# Patient Record
Sex: MALE | Race: WHITE | NOT HISPANIC OR LATINO | Employment: FULL TIME | URBAN - METROPOLITAN AREA
[De-identification: names, ages, dates, MRNs, and addresses within clinical notes are randomized per-mention and may not be internally consistent; named-entity substitution may affect disease eponyms.]

---

## 2024-02-04 ENCOUNTER — APPOINTMENT (OUTPATIENT)
Dept: CT IMAGING | Facility: HOSPITAL | Age: 34
End: 2024-02-04

## 2024-02-04 ENCOUNTER — APPOINTMENT (OUTPATIENT)
Dept: GENERAL RADIOLOGY | Facility: HOSPITAL | Age: 34
End: 2024-02-04

## 2024-02-04 ENCOUNTER — HOSPITAL ENCOUNTER (EMERGENCY)
Facility: HOSPITAL | Age: 34
Discharge: HOME | End: 2024-02-04
Attending: EMERGENCY MEDICINE | Admitting: EMERGENCY MEDICINE

## 2024-02-04 VITALS
TEMPERATURE: 97.9 F | HEART RATE: 93 BPM | DIASTOLIC BLOOD PRESSURE: 89 MMHG | OXYGEN SATURATION: 97 % | SYSTOLIC BLOOD PRESSURE: 135 MMHG | RESPIRATION RATE: 18 BRPM | HEIGHT: 68 IN | BODY MASS INDEX: 26.3 KG/M2 | WEIGHT: 173.5 LBS

## 2024-02-04 DIAGNOSIS — S42.024A CLOSED NONDISPLACED FRACTURE OF SHAFT OF RIGHT CLAVICLE, INITIAL ENCOUNTER: ICD-10-CM
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
## 2024-02-04 DIAGNOSIS — S42.101A CLOSED FRACTURE OF RIGHT SCAPULA, UNSPECIFIED PART OF SCAPULA, INITIAL ENCOUNTER: ICD-10-CM

## 2024-02-04 LAB
HOLD SPECIMEN: NORMAL
HOLD SPECIMEN: NORMAL
WHOLE BLOOD HOLD COAG: NORMAL
WHOLE BLOOD HOLD SPECIMEN: NORMAL

## 2024-02-04 PROCEDURE — 99284 EMERGENCY DEPT VISIT MOD MDM: CPT

## 2024-02-04 PROCEDURE — 96376 TX/PRO/DX INJ SAME DRUG ADON: CPT

## 2024-02-04 PROCEDURE — 70450 CT HEAD/BRAIN W/O DYE: CPT

## 2024-02-04 PROCEDURE — 25010000002 ONDANSETRON PER 1 MG: Performed by: EMERGENCY MEDICINE

## 2024-02-04 PROCEDURE — 72125 CT NECK SPINE W/O DYE: CPT

## 2024-02-04 PROCEDURE — 96375 TX/PRO/DX INJ NEW DRUG ADDON: CPT

## 2024-02-04 PROCEDURE — 71250 CT THORAX DX C-: CPT

## 2024-02-04 PROCEDURE — 73030 X-RAY EXAM OF SHOULDER: CPT

## 2024-02-04 PROCEDURE — 25010000002 HYDROMORPHONE 1 MG/ML SOLUTION: Performed by: EMERGENCY MEDICINE

## 2024-02-04 PROCEDURE — 96374 THER/PROPH/DIAG INJ IV PUSH: CPT

## 2024-02-04 RX ORDER — ONDANSETRON 2 MG/ML
4 INJECTION INTRAMUSCULAR; INTRAVENOUS ONCE
Status: COMPLETED | OUTPATIENT
Start: 2024-02-04 | End: 2024-02-04

## 2024-02-04 RX ORDER — HYDROCODONE BITARTRATE AND ACETAMINOPHEN 5; 325 MG/1; MG/1
1 TABLET ORAL EVERY 6 HOURS PRN
Qty: 15 TABLET | Refills: 0 | Status: SHIPPED | OUTPATIENT
Start: 2024-02-04 | End: 2024-02-09 | Stop reason: HOSPADM

## 2024-02-04 RX ADMIN — ONDANSETRON 4 MG: 2 INJECTION INTRAMUSCULAR; INTRAVENOUS at 16:09

## 2024-02-04 RX ADMIN — HYDROMORPHONE HYDROCHLORIDE 1 MG: 1 INJECTION, SOLUTION INTRAMUSCULAR; INTRAVENOUS; SUBCUTANEOUS at 17:13

## 2024-02-04 RX ADMIN — HYDROMORPHONE HYDROCHLORIDE 1 MG: 1 INJECTION, SOLUTION INTRAMUSCULAR; INTRAVENOUS; SUBCUTANEOUS at 16:09

## 2024-02-04 NOTE — Clinical Note
[facility] EMERGENCY ROOM  [address]  [place] [address]  Phone: [phone]    Melvin Padron was seen and treated in our emergency department on 2/4/2024.  He may return to work on 02/07/2024.         Thank you for choosing [facility].    Alec Juares, DO
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
## 2024-02-05 ENCOUNTER — TELEPHONE (OUTPATIENT)
Dept: ORTHOPEDIC SURGERY | Facility: CLINIC | Age: 34
End: 2024-02-05
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
## 2024-02-05 NOTE — DISCHARGE INSTRUCTIONS
Wear sling as directed.  Take pain medication as directed.  Apply ice to the affected area 20 minutes at a time 4 times daily.  Follow-up with orthopedist and your primary care doctor this week.

## 2024-02-08 ENCOUNTER — PREP FOR SURGERY (OUTPATIENT)
Dept: OTHER | Facility: HOSPITAL | Age: 34
End: 2024-02-08

## 2024-02-08 ENCOUNTER — OFFICE VISIT (OUTPATIENT)
Dept: ORTHOPEDIC SURGERY | Facility: CLINIC | Age: 34
End: 2024-02-08

## 2024-02-08 VITALS
BODY MASS INDEX: 27.28 KG/M2 | DIASTOLIC BLOOD PRESSURE: 87 MMHG | SYSTOLIC BLOOD PRESSURE: 134 MMHG | HEIGHT: 68 IN | WEIGHT: 180 LBS | OXYGEN SATURATION: 96 % | HEART RATE: 107 BPM

## 2024-02-08 DIAGNOSIS — S42.021A CLOSED DISPLACED FRACTURE OF SHAFT OF RIGHT CLAVICLE, INITIAL ENCOUNTER: ICD-10-CM

## 2024-02-08 DIAGNOSIS — S42.101A CLOSED FRACTURE OF RIGHT SCAPULA, UNSPECIFIED PART OF SCAPULA, INITIAL ENCOUNTER: Primary | ICD-10-CM

## 2024-02-08 DIAGNOSIS — S42.021A CLOSED DISPLACED FRACTURE OF SHAFT OF RIGHT CLAVICLE, INITIAL ENCOUNTER: Primary | ICD-10-CM

## 2024-02-08 RX ORDER — HYDROCODONE BITARTRATE AND ACETAMINOPHEN 7.5; 325 MG/1; MG/1
1 TABLET ORAL EVERY 4 HOURS PRN
Qty: 30 TABLET | Refills: 0 | Status: SHIPPED | OUTPATIENT
Start: 2024-02-08

## 2024-02-08 RX ORDER — CEFAZOLIN SODIUM 2 G/100ML
2 INJECTION, SOLUTION INTRAVENOUS ONCE
Status: CANCELLED | OUTPATIENT
Start: 2024-02-08 | End: 2024-02-08
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
## 2024-02-09 ENCOUNTER — APPOINTMENT (OUTPATIENT)
Dept: GENERAL RADIOLOGY | Facility: HOSPITAL | Age: 34
End: 2024-02-09

## 2024-02-09 ENCOUNTER — ANESTHESIA EVENT CONVERTED (OUTPATIENT)
Dept: ANESTHESIOLOGY | Facility: HOSPITAL | Age: 34
End: 2024-02-09

## 2024-02-09 ENCOUNTER — HOSPITAL ENCOUNTER (OUTPATIENT)
Facility: HOSPITAL | Age: 34
Setting detail: HOSPITAL OUTPATIENT SURGERY
Discharge: HOME | End: 2024-02-09
Attending: ORTHOPAEDIC SURGERY | Admitting: ORTHOPAEDIC SURGERY

## 2024-02-09 ENCOUNTER — ANESTHESIA EVENT (OUTPATIENT)
Dept: PERIOP | Facility: HOSPITAL | Age: 34
End: 2024-02-09

## 2024-02-09 ENCOUNTER — ANESTHESIA (OUTPATIENT)
Dept: PERIOP | Facility: HOSPITAL | Age: 34
End: 2024-02-09

## 2024-02-09 VITALS
HEIGHT: 68 IN | WEIGHT: 177.47 LBS | OXYGEN SATURATION: 98 % | RESPIRATION RATE: 15 BRPM | DIASTOLIC BLOOD PRESSURE: 87 MMHG | TEMPERATURE: 97.2 F | BODY MASS INDEX: 26.9 KG/M2 | HEART RATE: 76 BPM | SYSTOLIC BLOOD PRESSURE: 125 MMHG

## 2024-02-09 DIAGNOSIS — S42.021A CLOSED DISPLACED FRACTURE OF SHAFT OF RIGHT CLAVICLE, INITIAL ENCOUNTER: ICD-10-CM

## 2024-02-09 PROCEDURE — L3670 SO ACRO/CLAV CAN WEB PRE OTS: HCPCS | Performed by: ORTHOPAEDIC SURGERY

## 2024-02-09 PROCEDURE — 25010000002 FENTANYL CITRATE (PF) 50 MCG/ML SOLUTION: Performed by: NURSE ANESTHETIST, CERTIFIED REGISTERED

## 2024-02-09 PROCEDURE — 25010000002 CEFAZOLIN IN DEXTROSE 2000 MG/ 100 ML SOLUTION: Performed by: ORTHOPAEDIC SURGERY

## 2024-02-09 PROCEDURE — C1713 ANCHOR/SCREW BN/BN,TIS/BN: HCPCS | Performed by: ORTHOPAEDIC SURGERY

## 2024-02-09 PROCEDURE — 25010000002 SUGAMMADEX 200 MG/2ML SOLUTION: Performed by: NURSE ANESTHETIST, CERTIFIED REGISTERED

## 2024-02-09 PROCEDURE — 25010000002 MIDAZOLAM PER 1MG: Performed by: ANESTHESIOLOGY

## 2024-02-09 PROCEDURE — 23515 OPTX CLAVICULAR FX W/INT FIX: CPT | Performed by: ORTHOPAEDIC SURGERY

## 2024-02-09 PROCEDURE — 76000 FLUOROSCOPY <1 HR PHYS/QHP: CPT

## 2024-02-09 PROCEDURE — 25010000002 PROPOFOL 10 MG/ML EMULSION: Performed by: NURSE ANESTHETIST, CERTIFIED REGISTERED

## 2024-02-09 PROCEDURE — 25010000002 DEXAMETHASONE PER 1 MG: Performed by: NURSE ANESTHETIST, CERTIFIED REGISTERED

## 2024-02-09 PROCEDURE — 23515 OPTX CLAVICULAR FX W/INT FIX: CPT | Performed by: PHYSICIAN ASSISTANT

## 2024-02-09 PROCEDURE — 25010000002 ONDANSETRON PER 1 MG: Performed by: NURSE ANESTHETIST, CERTIFIED REGISTERED

## 2024-02-09 PROCEDURE — 25810000003 LACTATED RINGERS PER 1000 ML: Performed by: ANESTHESIOLOGY

## 2024-02-09 PROCEDURE — 25010000002 ROPIVACAINE PER 1 MG: Performed by: ANESTHESIOLOGY

## 2024-02-09 DEVICE — IMPLANTABLE DEVICE: Type: IMPLANTABLE DEVICE | Site: CLAVICLE | Status: FUNCTIONAL

## 2024-02-09 RX ORDER — PROMETHAZINE HYDROCHLORIDE 25 MG/1
25 SUPPOSITORY RECTAL ONCE AS NEEDED
Status: DISCONTINUED | OUTPATIENT
Start: 2024-02-09 | End: 2024-02-09 | Stop reason: HOSPADM

## 2024-02-09 RX ORDER — OXYCODONE HYDROCHLORIDE 5 MG/1
5 TABLET ORAL
Status: DISCONTINUED | OUTPATIENT
Start: 2024-02-09 | End: 2024-02-09 | Stop reason: HOSPADM

## 2024-02-09 RX ORDER — FENTANYL CITRATE 50 UG/ML
INJECTION, SOLUTION INTRAMUSCULAR; INTRAVENOUS AS NEEDED
Status: DISCONTINUED | OUTPATIENT
Start: 2024-02-09 | End: 2024-02-09 | Stop reason: SURG

## 2024-02-09 RX ORDER — MIDAZOLAM HYDROCHLORIDE 2 MG/2ML
2 INJECTION, SOLUTION INTRAMUSCULAR; INTRAVENOUS ONCE
Status: COMPLETED | OUTPATIENT
Start: 2024-02-09 | End: 2024-02-09

## 2024-02-09 RX ORDER — ACETAMINOPHEN 500 MG
1000 TABLET ORAL ONCE
Status: COMPLETED | OUTPATIENT
Start: 2024-02-09 | End: 2024-02-09

## 2024-02-09 RX ORDER — PROMETHAZINE HYDROCHLORIDE 12.5 MG/1
25 TABLET ORAL ONCE AS NEEDED
Status: DISCONTINUED | OUTPATIENT
Start: 2024-02-09 | End: 2024-02-09 | Stop reason: HOSPADM

## 2024-02-09 RX ORDER — SODIUM CHLORIDE 9 MG/ML
40 INJECTION, SOLUTION INTRAVENOUS AS NEEDED
Status: DISCONTINUED | OUTPATIENT
Start: 2024-02-09 | End: 2024-02-09 | Stop reason: HOSPADM

## 2024-02-09 RX ORDER — ONDANSETRON 2 MG/ML
INJECTION INTRAMUSCULAR; INTRAVENOUS AS NEEDED
Status: DISCONTINUED | OUTPATIENT
Start: 2024-02-09 | End: 2024-02-09 | Stop reason: SURG

## 2024-02-09 RX ORDER — ROPIVACAINE HYDROCHLORIDE 5 MG/ML
INJECTION, SOLUTION EPIDURAL; INFILTRATION; PERINEURAL
Status: COMPLETED | OUTPATIENT
Start: 2024-02-09 | End: 2024-02-09

## 2024-02-09 RX ORDER — MEPERIDINE HYDROCHLORIDE 25 MG/ML
12.5 INJECTION INTRAMUSCULAR; INTRAVENOUS; SUBCUTANEOUS
Status: DISCONTINUED | OUTPATIENT
Start: 2024-02-09 | End: 2024-02-09 | Stop reason: HOSPADM

## 2024-02-09 RX ORDER — PROPOFOL 10 MG/ML
VIAL (ML) INTRAVENOUS AS NEEDED
Status: DISCONTINUED | OUTPATIENT
Start: 2024-02-09 | End: 2024-02-09 | Stop reason: SURG

## 2024-02-09 RX ORDER — HYDROCODONE BITARTRATE AND ACETAMINOPHEN 7.5; 325 MG/1; MG/1
1 TABLET ORAL EVERY 4 HOURS PRN
Qty: 36 TABLET | Refills: 0 | Status: SHIPPED | OUTPATIENT
Start: 2024-02-09 | End: 2024-02-19 | Stop reason: SDUPTHER

## 2024-02-09 RX ORDER — NALOXONE HYDROCHLORIDE 4 MG/.1ML
SPRAY NASAL
Qty: 2 EACH | Refills: 0 | Status: SHIPPED | OUTPATIENT
Start: 2024-02-09

## 2024-02-09 RX ORDER — CEPHALEXIN 500 MG/1
500 CAPSULE ORAL EVERY 12 HOURS
Qty: 10 CAPSULE | Refills: 0 | Status: SHIPPED | OUTPATIENT
Start: 2024-02-09

## 2024-02-09 RX ORDER — ROCURONIUM BROMIDE 10 MG/ML
INJECTION, SOLUTION INTRAVENOUS AS NEEDED
Status: DISCONTINUED | OUTPATIENT
Start: 2024-02-09 | End: 2024-02-09 | Stop reason: SURG

## 2024-02-09 RX ORDER — SODIUM CHLORIDE, SODIUM LACTATE, POTASSIUM CHLORIDE, CALCIUM CHLORIDE 600; 310; 30; 20 MG/100ML; MG/100ML; MG/100ML; MG/100ML
9 INJECTION, SOLUTION INTRAVENOUS CONTINUOUS PRN
Status: DISCONTINUED | OUTPATIENT
Start: 2024-02-09 | End: 2024-02-09 | Stop reason: HOSPADM

## 2024-02-09 RX ORDER — DEXAMETHASONE SODIUM PHOSPHATE 4 MG/ML
INJECTION, SOLUTION INTRA-ARTICULAR; INTRALESIONAL; INTRAMUSCULAR; INTRAVENOUS; SOFT TISSUE AS NEEDED
Status: DISCONTINUED | OUTPATIENT
Start: 2024-02-09 | End: 2024-02-09 | Stop reason: SURG

## 2024-02-09 RX ORDER — LIDOCAINE HYDROCHLORIDE 20 MG/ML
INJECTION, SOLUTION EPIDURAL; INFILTRATION; INTRACAUDAL; PERINEURAL AS NEEDED
Status: DISCONTINUED | OUTPATIENT
Start: 2024-02-09 | End: 2024-02-09 | Stop reason: SURG

## 2024-02-09 RX ORDER — ONDANSETRON 2 MG/ML
4 INJECTION INTRAMUSCULAR; INTRAVENOUS ONCE AS NEEDED
Status: DISCONTINUED | OUTPATIENT
Start: 2024-02-09 | End: 2024-02-09 | Stop reason: HOSPADM

## 2024-02-09 RX ORDER — CEFAZOLIN SODIUM 2 G/100ML
2 INJECTION, SOLUTION INTRAVENOUS ONCE
Status: COMPLETED | OUTPATIENT
Start: 2024-02-09 | End: 2024-02-09

## 2024-02-09 RX ADMIN — SUGAMMADEX 50 MG: 100 INJECTION, SOLUTION INTRAVENOUS at 14:34

## 2024-02-09 RX ADMIN — ONDANSETRON HYDROCHLORIDE 4 MG: 2 SOLUTION INTRAMUSCULAR; INTRAVENOUS at 13:52

## 2024-02-09 RX ADMIN — SUGAMMADEX 50 MG: 100 INJECTION, SOLUTION INTRAVENOUS at 14:31

## 2024-02-09 RX ADMIN — LIDOCAINE HYDROCHLORIDE 100 MG: 20 INJECTION, SOLUTION EPIDURAL; INFILTRATION; INTRACAUDAL; PERINEURAL at 13:51

## 2024-02-09 RX ADMIN — PROPOFOL 20 MG: 10 INJECTION, EMULSION INTRAVENOUS at 14:06

## 2024-02-09 RX ADMIN — CEFAZOLIN SODIUM 2 G: 2 INJECTION, SOLUTION INTRAVENOUS at 13:44

## 2024-02-09 RX ADMIN — MIDAZOLAM HYDROCHLORIDE 2 MG: 1 INJECTION, SOLUTION INTRAMUSCULAR; INTRAVENOUS at 12:15

## 2024-02-09 RX ADMIN — ROCURONIUM BROMIDE 50 MG: 10 INJECTION, SOLUTION INTRAVENOUS at 13:51

## 2024-02-09 RX ADMIN — ACETAMINOPHEN 1000 MG: 500 TABLET ORAL at 12:07

## 2024-02-09 RX ADMIN — SODIUM CHLORIDE, POTASSIUM CHLORIDE, SODIUM LACTATE AND CALCIUM CHLORIDE 9 ML/HR: 600; 310; 30; 20 INJECTION, SOLUTION INTRAVENOUS at 12:04

## 2024-02-09 RX ADMIN — FENTANYL CITRATE 50 MCG: 50 INJECTION, SOLUTION INTRAMUSCULAR; INTRAVENOUS at 13:52

## 2024-02-09 RX ADMIN — FENTANYL CITRATE 50 MCG: 50 INJECTION, SOLUTION INTRAMUSCULAR; INTRAVENOUS at 13:51

## 2024-02-09 RX ADMIN — SUGAMMADEX 50 MG: 100 INJECTION, SOLUTION INTRAVENOUS at 14:36

## 2024-02-09 RX ADMIN — SUGAMMADEX 50 MG: 100 INJECTION, SOLUTION INTRAVENOUS at 14:37

## 2024-02-09 RX ADMIN — LIDOCAINE HYDROCHLORIDE 100 MG: 20 INJECTION, SOLUTION EPIDURAL; INFILTRATION; INTRACAUDAL; PERINEURAL at 13:52

## 2024-02-09 RX ADMIN — ROPIVACAINE HYDROCHLORIDE 30 ML: 5 INJECTION, SOLUTION EPIDURAL; INFILTRATION; PERINEURAL at 12:14

## 2024-02-09 RX ADMIN — PROPOFOL 20 MG: 10 INJECTION, EMULSION INTRAVENOUS at 14:29

## 2024-02-09 RX ADMIN — PROPOFOL 100 MG: 10 INJECTION, EMULSION INTRAVENOUS at 13:52

## 2024-02-09 RX ADMIN — DEXAMETHASONE SODIUM PHOSPHATE 4 MG: 4 INJECTION, SOLUTION INTRAMUSCULAR; INTRAVENOUS at 13:52

## 2024-02-09 RX ADMIN — PROPOFOL 200 MG: 10 INJECTION, EMULSION INTRAVENOUS at 13:51

## 2024-02-09 NOTE — OP NOTE
CLAVICLE OPEN REDUCTION INTERNAL FIXATION  Procedure Report    Patient Name:  Melvin Padron  YOB: 1990    Date of Surgery:  2/9/2024     Indications: The patient is a 33-year-old male who sustained a right clavicle fracture and a nondisplaced scapular fracture.  Risks and benefits of operative treatment were discussed.  Informed consent was obtained and he wished to proceed.  Risk of bleeding, infection, damage to neurovascular structures, heart attack, stroke, DVT/PE, anesthesia complications including death, continued pain and disability, malunion, nonunion, symptomatic hardware, among others were discussed.  Informed consent was obtained and he wished to proceed.    Pre-op Diagnosis:   Closed displaced fracture of shaft of right clavicle, initial encounter [S42.021A]  Nondisplaced right scapular fracture       Post-Op Diagnosis Codes:     * Closed displaced fracture of shaft of right clavicle, initial encounter [S42.021A]  Nondisplaced right scapular fracture    Procedure/CPT® Codes:      Procedure(s):  Right CLAVICLE OPEN REDUCTION INTERNAL FIXATION    Staff:  Surgeon(s):  Brijesh Reyes MD    Assistant: Aditya Crowe PA    Anesthesia: General with Block    Estimated Blood Loss: 20 mL    Implants:    Terri VariAx anatomic 3.5 clavicle plate    Specimen:          None        Findings: Right displaced midshaft clavicle fracture    Complications: None    Description of Procedure: The operative site was marked in the preoperative holding area.  A preoperative nerve block was performed by anesthesia.  The patient was brought to the operating room and was placed prone on the OR table.  The patient was placed into a semisitting position and all bony prominences were padded.  SCD boots were placed on the bilateral lower extremities.  The head was placed in a neutral position and was secured.  The extremity was prepped and draped in usual sterile fashion.  Preoperative  antibiotic was given.  Formal timeout was held.  The fracture site was palpated over the midshaft of the clavicle.  A transverse incision was made over the clavicle and the subcutaneous tissues and fascia were divided.  Crossing branches of the supraclavicular sensory nerves were protected.  The fascia was incised and periosteum elevated over the fracture site.  There was a long, oblique, vertically oriented midshaft clavicle fracture.  This was significantly displaced and shortened.  The fracture site was cleaned and reduced with a serrated reduction clamp.  2 lag screws were placed from anterior to posterior across the fracture to anatomically reduce the fracture.  A superior anatomically contoured 3.5 clavicle plate was placed.  It was secured laterally and medially with 3.5 nonlocking screws.  Fluoroscopy was used to confirm anatomic hardware positioning and fracture reduction.  At this point additional 3.5 locking screws were drilled, measured, and inserted medially and laterally for a total of 3 screws on each side of the fracture.  There was good purchase of the screws within the bone and the fracture was well secured.  There is no evidence of residual defect or need for bone grafting for the fracture as it was well reduced and fracture ends opposed.  At this point the wound was irrigated with Irrisept and saline.  The fascia was closed with #1 Vicryl in figure-of-eight fashion.  The subcutaneous tissues with 2-0 Vicryl and skin with staples.  Xeroform, 4 x 4's, Tegaderm was placed.  There was an abrasion over the anterior superior shoulder which was covered with Xeroform, 4 x 4 and Tegaderm.  The patient woken anesthesia in stable condition.  He was placed into a postoperative sling.  There were no complications and all counts were correct.  The patient was stable to recovery.       Assistant: Aditya Crowe PA  was responsible for performing the following activities: Retraction, Suction,  Irrigation, and Placing Dressing and their skilled assistance was necessary for the success of this case.    Brijesh Reyes MD     Date: 2/9/2024  Time: 14:54 EST

## 2024-02-09 NOTE — ANESTHESIA PREPROCEDURE EVALUATION
Anesthesia Evaluation     Patient summary reviewed and Nursing notes reviewed                Airway   Mallampati: I  TM distance: >3 FB  Neck ROM: full  No difficulty expected  Dental      Pulmonary - negative pulmonary ROS and normal exam    breath sounds clear to auscultation  Cardiovascular - negative cardio ROS and normal exam    Rhythm: regular  Rate: normal        Neuro/Psych- negative ROS  GI/Hepatic/Renal/Endo - negative ROS     Musculoskeletal (-) negative ROS    Abdominal    Substance History - negative use     OB/GYN negative ob/gyn ROS         Other                    Anesthesia Plan    ASA 1     general     intravenous induction     Anesthetic plan, risks, benefits, and alternatives have been provided, discussed and informed consent has been obtained with: patient.    CODE STATUS:

## 2024-02-09 NOTE — ANESTHESIA POSTPROCEDURE EVALUATION
Patient: Melvin Padron    Procedure Summary       Date: 02/09/24 Room / Location: [facility] OR  / [facility]    Anesthesia Start: 1344 Anesthesia Stop: 1500    Procedure: CLAVICLE OPEN REDUCTION INTERNAL FIXATION (Right: Shoulder) Diagnosis:       Closed displaced fracture of shaft of right clavicle, initial encounter      (Closed displaced fracture of shaft of right clavicle, initial encounter [S42.021A])    Surgeons: Brijesh Reyes MD Provider: Marcellus Solis MD    Anesthesia Type: general ASA Status: 1            Anesthesia Type: general    Vitals  Vitals Value Taken Time   /87 02/09/24 1550   Temp 36.2 °C (97.2 °F) 02/09/24 1502   Pulse 76 02/09/24 1550   Resp 15 02/09/24 1550   SpO2 98 % 02/09/24 1550           Post Anesthesia Care and Evaluation    Patient location during evaluation: bedside  Patient participation: complete - patient participated  Level of consciousness: awake  Pain management: adequate    Airway patency: patent  PONV Status: none  Cardiovascular status: acceptable and stable  Respiratory status: acceptable  Hydration status: acceptable    Comments: An Anesthesiologist personally participated in the most demanding procedures (including induction and emergence if applicable) in the anesthesia plan, monitored the course of anesthesia administration at frequent intervals and remained physically present and available for immediate diagnosis and treatment of emergencies.

## 2024-02-09 NOTE — ANESTHESIA PROCEDURE NOTES
Peripheral Block      Patient reassessed immediately prior to procedure    Patient location during procedure: pre-op  Start time: 2/9/2024 12:14 PM  Stop time: 2/9/2024 12:20 PM  Reason for block: at surgeon's request and post-op pain management  Performed by  Anesthesiologist: Marcellus Solis MD  Preanesthetic Checklist  Completed: patient identified, IV checked, site marked, risks and benefits discussed, surgical consent, monitors and equipment checked, pre-op evaluation and timeout performed  Prep:  Pt Position: supine (HOB elevated)  Sterile barriers:cap, washed/disinfected hands, sterile barriers, gloves, mask, partial drape and alcohol skin prep  Prep: ChloraPrep  Patient monitoring: blood pressure monitoring, continuous pulse oximetry and EKG  Procedure    Sedation: yes  Performed under: local infiltration  Guidance:ultrasound guided and nerve stimulator    ULTRASOUND INTERPRETATION.  Using ultrasound guidance a 22 G gauge needle was placed in close proximity to the brachial plexus nerve, at which point, under ultrasound guidance anesthetic was injected in the area of the nerve and spread of the anesthesia was seen on ultrasound in close proximity thereto.  There were no abnormalities seen on ultrasound; a digital image was taken; and the patient tolerated the procedure with no complications. Images:still images obtained, printed/placed on chart    Laterality:right  Block Type:interscalene  Injection Technique:single-shot  Needle Type:echogenic  Needle Gauge:22 G (2in)  Resistance on Injection: none    Medications Used: ropivacaine (NAROPIN) 0.5 % injection - Injection   30 mL - 2/9/2024 12:14:00 PM      Post Assessment  Injection Assessment: negative aspiration for heme, no paresthesia on injection and incremental injection  Patient Tolerance:comfortable throughout block  Complications:no  Additional Notes  The block or continuous infusion is requested by the referring physician for management of  postoperative pain, or pain related to a procedure. Ultrasound guidance (deemed medically necessary). Painless injection, pt was awake and conversant during the procedure without complications. Needle and surrounding structures visualized throughout procedure. No adverse reactions or complications seen during this period. Post-procedure image showed no signs of complication, and anatomy was consistent with an uncomplicated nerve blockade.

## 2024-02-09 NOTE — DISCHARGE INSTRUCTIONS
DISCHARGE INSTRUCTIONS  Post-Operative  Arthroscopic Shoulder Surgery      For your surgery you had:  General anesthesia (you may have a sore throat for the first 24 hours)  You received an anesthesia medication today that can cause hormonal forms of birth control to be ineffective. You should use a different form of birth control (to prevent pregnancy) for 7 days.   IV sedation.  Local anesthesia  Monitored anesthesia care  You may experience dizziness, drowsiness, or light-headedness for several hours following surgery/procedure.  Do not stay alone today or tonight.  Limit your activity for 24 hours.  Resume your diet slowly.  Follow whatever special dietary instructions you may have been given by your doctor.  You should not drive or operate machinery or drink alcohol for 24 hours or while you are taking pain medication.  You should not sign legally binding documents.  Post-Operative Day #1 is counted as the 1st day after surgery.  [] If you had a regional block, you will not have control of your arm for up to 12 hours.  Protect the arm with a sling or follow your physician's specific instructions.  Post-Operative Day #2  Remove your dressing.  Under the dressing you will either have sutures or staples.  Change dressing once or twice daily.  Place a dry dressing or band-aids over the small incisions.  Prior to dressing change, wash your hands.  Post-Operative Day #4  You may shower.  During the shower, you may let the water hit the incision and roll down but do not scrub or place any soap on the incision.  Do not soak it.  Pat it dry and do not put any ointments on the incision unless directed by surgeon. Then replace the dry dressing.    General Instructions  [x] Use ice pack to shoulder for 72 hours.  This can help with reducing swelling and pain relief.  Apply 20 minutes on and 20 minutes off.  Never place ice directly on skin.  Avoid getting dressing wet.  Keep arm elevated with sling to decrease swelling  and minimize throbbing pain.  The sling will provide support for your shoulder.  It is important to remove the sling 3-5 times daily to work on range-of-motion of the elbow, wrist and hand.  You will receive a prescription for physical therapy, unless otherwise instructed by physician.  After most shoulder surgeries, it is permissible to start exercises by slowing trying to crawl your fingers up a wall until your arm is parallel to the floor.  While doing this support the affected arm at the elbow or closer to the shoulder.  You may also lean over and let the arm and hand do small or large circles or write the alphabet with your hanging arm to keep it loose.  It is normal to have some pain with these gentle exercises.  The exercises help reduce swelling and pain overall and help to avoid a stiff shoulder post-operatively.  It is okay to come out of the sling for showering or for certain activities of daily living but avoid any lifting or carrying with the affected arm until instructed by the physician especially if you have had a repair of the rotator cuff or some type of reconstructive procedure.  It is okay to come out of the sling and support the arm with a pillow if you remain sedentary.  In general, sling use is preferred following a repair or reconstruction for 4 weeks.  If you have had a SAD (sub acromial decompression), continue sling use more liberally because there was not a repair or reconstruction that could be harmed.          DISCHARGE INSTRUCTIONS  Post-Operative   Arthroscopic Shoulder Surgery      Exercise fingers for 10 minutes every hour while awake.  The physician will typically inform the family and the patient whether or not a repair or reconstruction could be harmed.  If you have had a rotator cuff repair or reconstructive procedure, do not let the arm drop down suddenly from an elevated position down to your side despite improvements made in pain and over the 3 months following repair and  reconstruction.  It generally takes 8-12 weeks before the repair or reconstruction does not rely on sutures and anchors that are placed for the repair.  You are generally given a prescription for a narcotic medication.  Take as prescribed.  You may use over-the-counter anti-inflammatory medications such as Motrin or Aleve for additional pain or fever reduction if not allergic.  If you are taking the pain medication prescribed, do not take Tylenol too unless you consult with the pharmacist. The pain medications generally have Tylenol in them and too much Tylenol can be harmful.  Sometimes it is recommended to take an anti-inflammatory in between doses of prescription pain medication if additional pain relief is needed.  Consult with your physician or your pharmacist before taking over-the-counter pain medications/anti-inflammatories.  It is not uncommon to have a fever post-operatively especially in the first 24-48 hours.  Anti-inflammatories can be used for fever reduction also.  It is normal to have some redness or irritation around skin sutures or staples, however, if you have any expanding areas of redness with a persistent fever and increasing pain notify the physician as soon as possible.  The incidence of blood clots is low following arthroscopic procedures, however, if you notice any increasing pain or swelling in your calves or legs, contact the physician as soon as possible.   It is difficult to sleep in the customary fashion following a shoulder surgery.  It is usually necessary to sleep with the shoulder above the level of the heart such as in a recliner, couch or with the head of the bed elevated.  This should slowly improve over the weeks following shoulder surgery.  If unable to urinate 6 to 8 hours after surgery or urinating frequently in small amounts, notify the physician or go to the nearest Emergency Room.  SPECIAL INSTRUCTIONS:        NOTIFY YOUR PHYSICIAN IF YOU EXPERIENCE:  Numbness of  fingers.  Inability to move fingers.  Extreme coldness, paleness or blue dis-coloration of fingers.  Any pain other than from the incision, such as swelling of the arm that blocks circulation of fingers.  Follow verbal instructions from your doctor.  Medications per physician instructions as indicated on Discharge Medication Information Sheet.  You should see  ______________________for follow-up care  on     Phone number: __________________________________  Missing your appointment/follow-up could lead to serious complications  If you have an emergency and cannot reach your doctor, go to an Emergency Room nearest your home.

## 2024-02-19 DIAGNOSIS — S42.021A CLOSED DISPLACED FRACTURE OF SHAFT OF RIGHT CLAVICLE, INITIAL ENCOUNTER: ICD-10-CM

## 2024-02-19 RX ORDER — HYDROCODONE BITARTRATE AND ACETAMINOPHEN 7.5; 325 MG/1; MG/1
1 TABLET ORAL EVERY 4 HOURS PRN
Qty: 36 TABLET | Refills: 0 | Status: SHIPPED | OUTPATIENT
Start: 2024-02-19 | End: 2024-02-23 | Stop reason: SDUPTHER

## 2024-02-23 ENCOUNTER — OFFICE VISIT (OUTPATIENT)
Dept: ORTHOPEDIC SURGERY | Facility: CLINIC | Age: 34
End: 2024-02-23

## 2024-02-23 VITALS
OXYGEN SATURATION: 97 % | WEIGHT: 177.47 LBS | DIASTOLIC BLOOD PRESSURE: 89 MMHG | HEART RATE: 111 BPM | HEIGHT: 68 IN | BODY MASS INDEX: 26.9 KG/M2 | SYSTOLIC BLOOD PRESSURE: 143 MMHG

## 2024-02-23 DIAGNOSIS — M25.511 RIGHT SHOULDER PAIN, UNSPECIFIED CHRONICITY: ICD-10-CM

## 2024-02-23 DIAGNOSIS — Z47.89 AFTERCARE FOLLOWING SURGERY OF THE MUSCULOSKELETAL SYSTEM: Primary | ICD-10-CM

## 2024-02-23 DIAGNOSIS — S42.021A CLOSED DISPLACED FRACTURE OF SHAFT OF RIGHT CLAVICLE, INITIAL ENCOUNTER: ICD-10-CM

## 2024-02-23 RX ORDER — HYDROCODONE BITARTRATE AND ACETAMINOPHEN 7.5; 325 MG/1; MG/1
1 TABLET ORAL EVERY 4 HOURS PRN
Qty: 42 TABLET | Refills: 0 | Status: SHIPPED | OUTPATIENT
Start: 2024-02-23

## 2024-02-23 NOTE — PROGRESS NOTES
"Chief Complaint  Pain and Follow-up of the Right Clavicle and Suture / Staple Removal    Subjective          History of Present Illness      Melvin Padron is a 33 y.o. male  presents to [facility] for     Patient presents for 2-week postop evaluation of right clavicle open reduction internal fixation 2024.  Patient states he is \"doing better \".  He states he has been wearing the sling he states he has been caring for the incision and he denies redness drainage or dehiscence.  He had staples removed and Steri-Strips placed.  He is taking pain medication he is off of work.  He states he has been doing his own home exercises      Allergies   Allergen Reactions    Penicillins Unknown - Low Severity     Patient unsure of severity        Social History     Socioeconomic History    Marital status: Single   Tobacco Use    Smoking status: Former     Types: Cigarettes     Quit date: 2023     Years since quittin.2    Smokeless tobacco: Never   Vaping Use    Vaping Use: Never used   Substance and Sexual Activity    Alcohol use: Yes    Drug use: Yes     Types: Marijuana     Comment: \" FROM TIME TO TIME \" PER PATIENT    Sexual activity: Defer        REVIEW OF SYSTEMS    Constitutional: Awake alert and oriented x3, no acute distress, denies fevers, chills, weight loss  Respiratory: No respiratory distress  Vascular: Brisk cap refill, Intact distal pulses, No cyanosis, compartments soft with no signs or symptoms of compartment syndrome or DVT.   Cardiovascular: Denies chest pain, shortness of breath  Skin: Denies rashes, acute skin changes  Neurologic: Denies headache, loss of consciousness  MSK: Right shoulder pain      Objective   Vital Signs:   /89   Pulse 111   Ht 172.7 cm (68\")   Wt 80.5 kg (177 lb 7.5 oz)   SpO2 97%   BMI 26.98 kg/m²     Body mass index is 26.98 kg/m².    Physical Exam       Right shoulder: Incision is healing well, no erythema, no drainage " or dehiscence, no signs of infection range of motion of the shoulder appropriate, sensation intact to light touch, elbow wrist hand range of motion intact/appropriate, numbness around the incision site.      Procedures    Imaging Results (Most Recent)       Procedure Component Value Units Date/Time    XR Clavicle Right [[id number]] Resulted: 02/23/24 1100     Updated: 02/23/24 1101    Narrative:      View:AP/Lateral view(s)  Site: Right clavicle  Indication: Clavicle pain  Study: X-rays ordered, taken in the office, and reviewed today  X-ray: Healing nondisplaced fracture of the scapula, good healing of   clavicle fracture with intact plate and screws, no signs of hardware   failure or loosening  Comparative data: Intraoperative studies             Result Review :   The following data was reviewed by: JANNIE Mejia on 02/23/2024:  Data reviewed : Radiologic studies reviewed by me with the patient              Assessment and Plan    Diagnoses and all orders for this visit:    1. Aftercare following surgery of ORIF right clavicle 2/9/2024 (Primary)    2. Right shoulder pain, unspecified chronicity  -     XR Clavicle Right        Reviewed x-rays with the patient discussed diagnosis and treatment options with him he was advised to avoid heavy lift push pull, he will remain off of work he declined offer for therapy but he was given home exercises.  Sutures/staples removed in office today. Steri-strips applied. Discussed incision care/hygiene. May shower, but do not submerge in water until fully healed.  Advised patient that if any concerning symptoms regarding incision appearance occur that they should call us right away, patient expressed understanding.  Follow-up in 2 weeks for recheck with x-rays    Call or return if worsening symptoms.    Follow Up   Return in about 2 weeks (around 3/8/2024) for Recheck.  Patient was given instructions and counseling regarding his condition or for health maintenance  advice. Please see specific information pulled into the AVS if appropriate.       EMR Dragon/Transcription disclaimer:  Much of this encounter note is an electronic transcription/translation of spoken language to printed text, aka voice recognition.  The electronic translation of spoken language may permit erroneous or at times nonsensical words or phrases to be inadvertently transcribed; although I have reviewed the note for such errors, some may still exist so please interpret based on surrounding text content.

## 2024-03-18 ENCOUNTER — OFFICE VISIT (OUTPATIENT)
Dept: ORTHOPEDIC SURGERY | Facility: CLINIC | Age: 34
End: 2024-03-18
Payer: MEDICAID

## 2024-03-18 VITALS
SYSTOLIC BLOOD PRESSURE: 153 MMHG | WEIGHT: 177.47 LBS | OXYGEN SATURATION: 97 % | HEIGHT: 68 IN | HEART RATE: 83 BPM | BODY MASS INDEX: 26.9 KG/M2 | DIASTOLIC BLOOD PRESSURE: 78 MMHG

## 2024-03-18 DIAGNOSIS — M25.511 RIGHT SHOULDER PAIN, UNSPECIFIED CHRONICITY: Primary | ICD-10-CM

## 2024-03-18 DIAGNOSIS — Z47.89 AFTERCARE FOLLOWING SURGERY OF THE MUSCULOSKELETAL SYSTEM: ICD-10-CM

## 2024-03-18 PROCEDURE — 99024 POSTOP FOLLOW-UP VISIT: CPT | Performed by: PHYSICIAN ASSISTANT

## 2024-03-18 NOTE — PROGRESS NOTES
"Chief Complaint  Follow-up of the Right Clavicle    Subjective          History of Present Illness      Melvin Padron is a 34 y.o. male  presents to [facility] for     Patient presents for follow-up evaluation of right clavicle open reduction internal fixation 2024.  Patient states his shoulder has been doing well he states his pain is controlled he denies need for pain medicine or NSAIDs.  He states the incision has healed well and denies redness drainage or dehiscence.  He states he has good range of motion he has been doing his own range of motion exercises.  He denies difficulty with range of motion.  He does state he has some numbness around the incision site but denies right upper extremity numbness and tingling.  He is happy with his progress he would like to return to work on 2024.      Allergies   Allergen Reactions    Penicillins Unknown - Low Severity     Patient unsure of severity        Social History     Socioeconomic History    Marital status: Single   Tobacco Use    Smoking status: Former     Current packs/day: 0.00     Types: Cigarettes     Quit date: 2023     Years since quittin.3    Smokeless tobacco: Never   Vaping Use    Vaping status: Never Used   Substance and Sexual Activity    Alcohol use: Yes    Drug use: Yes     Types: Marijuana     Comment: \" FROM TIME TO TIME \" PER PATIENT    Sexual activity: Defer        REVIEW OF SYSTEMS    Constitutional: Awake alert and oriented x3, no acute distress, denies fevers, chills, weight loss  Respiratory: No respiratory distress  Vascular: Brisk cap refill, Intact distal pulses, No cyanosis, compartments soft with no signs or symptoms of compartment syndrome or DVT.   Cardiovascular: Denies chest pain, shortness of breath  Skin: Denies rashes, acute skin changes  Neurologic: Denies headache, loss of consciousness  MSK: Right shoulder pain      Objective   Vital Signs:   /78   Pulse 83   " "Ht 172.7 cm (68\")   Wt 80.5 kg (177 lb 7.5 oz)   SpO2 97%   BMI 26.98 kg/m²     Body mass index is 26.98 kg/m².    Physical Exam       Right shoulder: Well-healed incision, no erythema, no ecchymosis or swelling, no effusion, no fluctuance or signs of infection, active forward elevation 180 active abduction 140 external rotation with abduction 90 internal rotation to T10, 5 out of 5 strength, neurovascularly intact      Procedures    Imaging Results (Most Recent)       Procedure Component Value Units Date/Time    XR Clavicle Right [[id number]] Resulted: 03/18/24 1333     Updated: 03/18/24 1334    Narrative:      View:AP/Lateral view(s)  Site: Right clavicle  Indication: Right clavicle pain  Study: X-rays ordered, taken in the office, and reviewed today  X-ray: Good healing of clavicle shaft fracture with intact plate and   screws, no signs of hardware failure or loosening.  Comparative data: Previous studies             Result Review :   The following data was reviewed by: JANINE Mejia on 03/18/2024:  Data reviewed : Radiologic studies reviewed by me with the patient              Assessment and Plan    Diagnoses and all orders for this visit:    1. Right shoulder pain, unspecified chronicity (Primary)  -     XR Clavicle Right    2. Aftercare following surgery of ORIF right clavicle 2/9/2024        Reviewed x-rays with the patient discussed diagnosis and treatment options with him he was advised to continue home exercise program, he states he would like to return to work on 4/1/2024.  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    Follow Up   Return in about 4 weeks (around 4/15/2024) for Recheck.  Patient was given instructions and counseling regarding his condition or for health maintenance advice. Please see specific " information pulled into the AVS if appropriate.       EMR Dragon/Transcription disclaimer:  Much of this encounter note is an electronic transcription/translation of spoken language to printed text, aka voice recognition.  The electronic translation of spoken language may permit erroneous or at times nonsensical words or phrases to be inadvertently transcribed; although I have reviewed the note for such errors, some may still exist so please interpret based on surrounding text content.

## 2024-04-19 ENCOUNTER — OFFICE VISIT (OUTPATIENT)
Dept: ORTHOPEDIC SURGERY | Facility: CLINIC | Age: 34
End: 2024-04-19

## 2024-04-19 VITALS
BODY MASS INDEX: 26.9 KG/M2 | DIASTOLIC BLOOD PRESSURE: 79 MMHG | HEART RATE: 59 BPM | HEIGHT: 68 IN | SYSTOLIC BLOOD PRESSURE: 126 MMHG | OXYGEN SATURATION: 98 % | WEIGHT: 177.47 LBS

## 2024-04-19 DIAGNOSIS — Z47.89 AFTERCARE FOLLOWING SURGERY OF THE MUSCULOSKELETAL SYSTEM: Primary | ICD-10-CM

## 2024-04-19 DIAGNOSIS — M25.511 RIGHT SHOULDER PAIN, UNSPECIFIED CHRONICITY: ICD-10-CM

## 2024-04-19 NOTE — PROGRESS NOTES
"Chief Complaint  Follow-up of the Right Clavicle    Subjective          History of Present Illness      Melvin Padron is a 34 y.o. male  presents to [facility] for     Patient presents for follow-up evaluation of right clavicle open reduction internal fixation 2024.  Patient states his shoulder and collarbone are doing well he states it is sore at times but in general he states he has \"no issues \"or flareups \".  Patient denies pain with range of motion denies difficulty with range of motion denies weakness he denies need for pain medication or NSAIDs he does state the incision still has some numbness around the incision site.  Patient is back to work with no concerns.    Allergies   Allergen Reactions    Penicillins Unknown - Low Severity     Patient unsure of severity        Social History     Socioeconomic History    Marital status: Single   Tobacco Use    Smoking status: Former     Current packs/day: 0.00     Types: Cigarettes     Quit date: 2023     Years since quittin.4    Smokeless tobacco: Never   Vaping Use    Vaping status: Never Used   Substance and Sexual Activity    Alcohol use: Yes    Drug use: Yes     Types: Marijuana     Comment: \" FROM TIME TO TIME \" PER PATIENT    Sexual activity: Defer        REVIEW OF SYSTEMS    Constitutional: Awake alert and oriented x3, no acute distress, denies fevers, chills, weight loss  Respiratory: No respiratory distress  Vascular: Brisk cap refill, Intact distal pulses, No cyanosis, compartments soft with no signs or symptoms of compartment syndrome or DVT.   Cardiovascular: Denies chest pain, shortness of breath  Skin: Denies rashes, acute skin changes  Neurologic: Denies headache, loss of consciousness  MSK: Right shoulder pain      Objective   Vital Signs:   /79   Pulse 59   Ht 172.7 cm (68\")   Wt 80.5 kg (177 lb 7.5 oz)   SpO2 98%   BMI 26.98 kg/m²     Body mass index is 26.98 kg/m².    Physical " Exam       Right shoulder: Well-healed incision, no erythema, no ecchymosis, no swelling, no signs of infection active forward elevation 180 active abduction 150 external rotation with abduction 90 internal rotation to T8, 5 out of 5 strength, neurovascularly intact, no pain with resisted range of motion, mild stiffness with crossarm adduction      Procedures    Imaging Results (Most Recent)       Procedure Component Value Units Date/Time    XR Clavicle Right [[id number]] Resulted: 04/19/24 0844     Updated: 04/19/24 0845    Narrative:      View:AP/Lateral view(s)  Site: Right clavicle  Indication: Right clavicle pain  Study: X-rays ordered, taken in the office, and reviewed today  X-ray: Well-healed clavicle fracture with intact plate and screws, no   signs of hardware failure or loosening  Comparative data: Previous studies             Result Review :   The following data was reviewed by: JANINE Mejia on 04/19/2024:  Data reviewed : Radiologic studies reviewed by me with the patient              Assessment and Plan    Diagnoses and all orders for this visit:    1. Aftercare following surgery of ORIF right clavicle 2/9/2024 (Primary)    2. Right shoulder pain, unspecified chronicity  -     XR Clavicle Right        Reviewed x-rays with the patient discussed diagnosis and treatment options with him he was advised to continue activity as tolerated work as tolerated and follow-up as needed, patient agreed    Call or return if worsening symptoms.    Follow Up   Return if symptoms worsen or fail to improve.  Patient was given instructions and counseling regarding his condition or for health maintenance advice. Please see specific information pulled into the AVS if appropriate.       EMR Dragon/Transcription disclaimer:  Part of this note may be an electronic transcription/translation of spoken language to printed text using the Dragon Dictation System

## (undated) DEVICE — Device

## (undated) DEVICE — ELECTRD BLD EDGE COAT 3IN

## (undated) DEVICE — SLNG SWATH SHOULDERPAD3 35DEG/ABDUCT UNIV

## (undated) DEVICE — STRIP CLS WND CURAD MEDI/STRIP HYPOALLERG 0.5X4IN STRL PK/6

## (undated) DEVICE — SUT VIC 2/0 CT1 36IN UD VCP945H

## (undated) DEVICE — APPL CHLORAPREP HI/LITE 26ML ORNG

## (undated) DEVICE — SUT VIC PLS CTD BR 1 CT1 27IN VIL

## (undated) DEVICE — GLV SURG SENSICARE SLT PF LF 7.5 STRL

## (undated) DEVICE — PENCL E/S SMOKEEVAC W/TELESCP CANN

## (undated) DEVICE — GLV SURG PROTEXIS PI BLU NEUTHERA PF 8

## (undated) DEVICE — GOWN SURG SIRIUS POLYREINF W/SLV 2XL XLNG STRL

## (undated) DEVICE — SLV SCD KN/LEN ADJ EXPRSS BLENDED MD 1P/U

## (undated) DEVICE — PK EXTREM CUST HAR

## (undated) DEVICE — SUT MONOCRYL PLS ANTIB UND 3/0  PS1 27IN

## (undated) DEVICE — GLV SURG SENSICARE PI ORTHO SZ8 LF STRL

## (undated) DEVICE — PAD GRND REM POLYHESIVE A/ DISP

## (undated) DEVICE — DRP IOBAN ANTIMICRO 23X17IN

## (undated) DEVICE — BLD SCLPL RIBBACK C/SS SZ15